# Patient Record
Sex: MALE | Race: WHITE | NOT HISPANIC OR LATINO | Employment: FULL TIME | URBAN - METROPOLITAN AREA
[De-identification: names, ages, dates, MRNs, and addresses within clinical notes are randomized per-mention and may not be internally consistent; named-entity substitution may affect disease eponyms.]

---

## 2023-02-20 ENCOUNTER — OFFICE VISIT (OUTPATIENT)
Dept: ORTHOPEDIC SURGERY | Facility: CLINIC | Age: 28
End: 2023-02-20
Payer: COMMERCIAL

## 2023-02-20 VITALS
BODY MASS INDEX: 30.75 KG/M2 | SYSTOLIC BLOOD PRESSURE: 136 MMHG | WEIGHT: 232 LBS | HEIGHT: 73 IN | DIASTOLIC BLOOD PRESSURE: 90 MMHG

## 2023-02-20 DIAGNOSIS — M25.512 LEFT SHOULDER PAIN, UNSPECIFIED CHRONICITY: Primary | ICD-10-CM

## 2023-02-20 DIAGNOSIS — S43.005A SHOULDER SEPARATION, LEFT, INITIAL ENCOUNTER: ICD-10-CM

## 2023-02-20 PROCEDURE — 99204 OFFICE O/P NEW MOD 45 MIN: CPT | Performed by: ORTHOPAEDIC SURGERY

## 2023-02-20 NOTE — PROGRESS NOTES
"    [facility] Note        Subjective     Pain of the Left Shoulder      HPI    Jcak Ferrell is a 27 y.o. male.  He injured his left shoulder lifting weights on February 17.  He felt a pop.  Pain at the AC joint.  He works in sales management.  He has self treated with a home exercise program.  He is concerned he tore his labrum.  Pain is 8 out of 10.  It is stabbing shooting over the proximal biceps tendon and AC joint  No past medical history on file.   No past surgical history on file.   No family history on file.  Social History     Socioeconomic History   • Marital status:    Tobacco Use   • Smoking status: Never   • Smokeless tobacco: Never   Substance and Sexual Activity   • Alcohol use: Yes     Comment: once every 6 months   • Drug use: Never      No current outpatient medications on file prior to visit.     No current facility-administered medications on file prior to visit.      No Known Allergies       Review of Systems   Constitutional: Negative.    HENT: Negative.    Eyes: Negative.    Respiratory: Negative.    Cardiovascular: Negative.    Gastrointestinal: Negative.    Endocrine: Negative.    Genitourinary: Negative.    Musculoskeletal: Negative.    Skin: Negative.    Allergic/Immunologic: Negative.    Neurological: Negative.    Hematological: Negative.    Psychiatric/Behavioral: Negative.         I reviewed the patient's chief complaint, history of present illness, review of systems, past medical history, surgical history, family history, social history, medications and allergy list.        Objective      Physical Exam  /90   Ht 185.4 cm (73\")   Wt 105 kg (232 lb)   BMI 30.61 kg/m²     Body mass index is 30.61 kg/m².    General  Mental Status - alert  General Appearance - cooperative, well groomed, not in acute distress  Orientation - Oriented X3  Build & Nutrition - well developed and well nourished  Posture - normal posture  Gait - normal gait       Ortho " Exam  Left shoulder full motion full-strength pain on crossarm abduction tender AC joint.  Tender proximal biceps tendon.  Equivocal Montmorency's test    Imaging/Studies  Imaging Results (Last 24 Hours)     Procedure Component Value Units Date/Time    XR Shoulder 2+ View Left [[id number]] Resulted: 02/20/23 1331     Updated: 02/20/23 1332    Narrative:      Left Shoulder X-Ray  Indication: Pain  AP, scapular Y, and axillary lateral views    Findings:  No fracture  No bony lesion  Normal soft tissues  Normal joint spaces    No prior studies were available for comparison.            Assessment    Assessment:  1. Left shoulder pain, unspecified chronicity    2. Shoulder separation, left, initial encounter        Plan:  Continue over-the-counter medication as needed for discomfort  He will continue anti-inflammatories.  I have ordered an MRI arthrogram left shoulder to rule out SLAP tear.  I will see him back after the MRI        Shane Parker MD  02/20/23  13:39 EST      Dictated Utilizing Dragon Dictation.

## 2023-02-22 ENCOUNTER — TELEPHONE (OUTPATIENT)
Dept: ORTHOPEDIC SURGERY | Facility: CLINIC | Age: 28
End: 2023-02-22
Payer: COMMERCIAL

## 2023-02-22 NOTE — TELEPHONE ENCOUNTER
Left voicemail for patient. Informed him that we do not make the CDs in office and he will need to go to the radiology department in the [address] building to receive a copy. Informed him that they can do it the same day and he does not need to call ahead.

## 2023-02-22 NOTE — TELEPHONE ENCOUNTER
Spoke with patient and informed him that a referral was placed on Monday, February 20th for a routine MRI. Explained that we send a referral out and [facility] will contact him to set up an appointment and that it typically can take them about a week but it varies. Told him he can contact [facility] at [phone] and get an updated status on his appointment.

## 2023-02-22 NOTE — TELEPHONE ENCOUNTER
Provider: DR. WILSON    Caller: PATIENT    Relationship to Patient: SELF  =  Phone Number:  [phone]    Reason for Call: PT. IS ASKING TO GET A COPY OF HIS LEFT SHOULDER XRAY ON DISC.   HE HAS APPT. ON Friday WITH ANOTHER DOCTOR THAT HE NEEDS TO TAKE IT TO.  HE IS ASKING TO  TODAY OR TOMORROW.  PLEASE CALL TO LET PT. KNOW.

## 2023-02-22 NOTE — TELEPHONE ENCOUNTER
Caller: Jack Ferrell    Relationship: Self    Best call back number: [phone]    What orders are you requesting (i.e. lab or imaging): LT SHOULDER MRI    Additional notes: PT HAS NOT BEEN CONTACTED YET FOR MRI APPT SINCE 02/20 LAST VISIT WITH DR. RONQUILLO. PLEASE CALL BACK AND UPDATE.